# Patient Record
(demographics unavailable — no encounter records)

---

## 2018-04-05 NOTE — RAD
RIGHT KNEE FOUR VIEWS:

4/5/18

 

HISTORY: 

22-year-old male with history of right knee pain after stepping off a sidewalk and twisting his knee.


 

COMPARISON:  

11/26/14. 

 

FINDINGS:  

No fracture, dislocation, or other significant acute osseous abnormality. 

 

IMPRESSION:  

Unremarkable right knee. 

 

POS: [facility]

## 2019-03-14 NOTE — RAD
THREE VIEWS LEFT THUMB:

3/14/19

 

HISTORY: 

Left thumb injury. 

 

FINDINGS:  

There is no evidence of a fracture, dislocation, or other osseous abnormality involving the left thum
b. 

 

IMPRESSION:  

No acute osseous abnormality.

 

POS: [facility]